# Patient Record
Sex: MALE | Race: WHITE
[De-identification: names, ages, dates, MRNs, and addresses within clinical notes are randomized per-mention and may not be internally consistent; named-entity substitution may affect disease eponyms.]

---

## 2019-07-31 ENCOUNTER — HOSPITAL ENCOUNTER (OUTPATIENT)
Dept: HOSPITAL 89 - OR | Age: 64
Discharge: HOME | End: 2019-07-31
Attending: SURGERY
Payer: COMMERCIAL

## 2019-07-31 VITALS — SYSTOLIC BLOOD PRESSURE: 83 MMHG | DIASTOLIC BLOOD PRESSURE: 57 MMHG

## 2019-07-31 VITALS — HEIGHT: 68 IN | BODY MASS INDEX: 21.52 KG/M2 | WEIGHT: 142 LBS

## 2019-07-31 VITALS — SYSTOLIC BLOOD PRESSURE: 132 MMHG | DIASTOLIC BLOOD PRESSURE: 77 MMHG

## 2019-07-31 VITALS — SYSTOLIC BLOOD PRESSURE: 123 MMHG | DIASTOLIC BLOOD PRESSURE: 44 MMHG

## 2019-07-31 VITALS — SYSTOLIC BLOOD PRESSURE: 111 MMHG | DIASTOLIC BLOOD PRESSURE: 78 MMHG

## 2019-07-31 VITALS — SYSTOLIC BLOOD PRESSURE: 109 MMHG | DIASTOLIC BLOOD PRESSURE: 74 MMHG

## 2019-07-31 VITALS — SYSTOLIC BLOOD PRESSURE: 89 MMHG | DIASTOLIC BLOOD PRESSURE: 67 MMHG

## 2019-07-31 DIAGNOSIS — D12.8: ICD-10-CM

## 2019-07-31 DIAGNOSIS — D12.2: ICD-10-CM

## 2019-07-31 DIAGNOSIS — R19.5: Primary | ICD-10-CM

## 2019-07-31 PROCEDURE — 45385 COLONOSCOPY W/LESION REMOVAL: CPT

## 2019-07-31 PROCEDURE — 88305 TISSUE EXAM BY PATHOLOGIST: CPT

## 2019-07-31 PROCEDURE — 00811 ANES LWR INTST NDSC NOS: CPT

## 2019-07-31 PROCEDURE — 45380 COLONOSCOPY AND BIOPSY: CPT

## 2019-07-31 NOTE — NUR
1030 VSS, BP WNL

1045 ORTHOSTATICS DONE, STABLE, DENIES DIZZINESS, REASSESSED, UNREMARKABLE, ALLOWED TO DRESS

1050 IV OUT, C/D INSTRUCTIONS COVERED, ALL QUESTIONS ANSWERED

1059 PT OUT BY FOOT ALONG WITH WIFE TO ADMITTING DOORS, STEADY ON FEET, ALL BELONGINGS WITH 
PT

## 2019-07-31 NOTE — NUR
0977 PT ARRIVED TO SD VIA CART, SAFETY MAINTAINED, SBAR FORM THEA GIRARD RN AND DR. BLACKMON, 
VSS, PARTNER  AT BEDSIDE. 

0981 DR. ULLRICH AT BEDSIDE TO DISCUSS FINDINGS WITH SPOUSE. ALL QUESTIONS ANSWERED, REPEAT 
BP STILL LOW, WILL CONTINUE TO MONITOR ASSESSED, UNREMARKABLE

## 2019-07-31 NOTE — NUR
1000 VSS, BP STILL LOW, PT STILL RESTING, DOWN TO 1L NC, SATTING WELL

1015 WHOLE BAG INFUSED, D/C'D FROM IV TUBING, SALINE LOCKED IV, PT MOVED TO ROOM AIR

## 2019-07-31 NOTE — SHORT(OUTPT) DISCHARGE SUMMARY
Discharge Summary


Reason for Hosp/Final Diag:  


(1) Positive colorectal cancer screening using Cologuard test


Status:  Acute


Hospital Course & Plan:  Colonoscopy with polypectomy x3 completed without 


problems.





Departure


Discharge to:  Home, Self Care





Discharge Instructions


Home Meds


Reported Medications


Docosahexanoic Acid (DHA) 100 Mg Capsule, 100 MG PO QDAY, CAPSULE


   7/29/19


Vit A/Vit C/Vit E/Zinc/Copper (PRESERVISION AREDS TABLET) 1 Each Tablet, 1 EACH 


PO QDAY


   7/29/19


Saw [place] Fruit (SAW [place]MET[place]) 450 Mg Capsule, 450 MG PO QDAY, CAPSULE


   7/29/19


Vitamin B Complex & Vit C No.3 (B COMPLEX WITH VITAMIN C) 1 Each Capsule, 1 EACH


PO QDAY, CAPSULE


   7/29/19


Diet:  Regular


Activity:  As Tolerated


Special Instructions:  


Your colonoscopy was completed without problems and your prep was


excellent (Good Job!!).  I removed 3 polyps from you colon and rectum and


they were sent to pathology.  My office will call you in the next week or


two to let you know what the polyps are and when your next colonoscopy


should be (possibly in 3, 5, or 10 years) depending on pathology results.











ULLRICH,JOHN A MD              Jul 31, 2019 10:01